# Patient Record
Sex: MALE | Race: WHITE | NOT HISPANIC OR LATINO | Employment: PART TIME | URBAN - METROPOLITAN AREA
[De-identification: names, ages, dates, MRNs, and addresses within clinical notes are randomized per-mention and may not be internally consistent; named-entity substitution may affect disease eponyms.]

---

## 2023-09-27 DIAGNOSIS — M54.2 CERVICAL PAIN: Primary | ICD-10-CM

## 2023-09-29 ENCOUNTER — HOSPITAL ENCOUNTER (OUTPATIENT)
Dept: DATA CONVERSION | Facility: HOSPITAL | Age: 28
Discharge: HOME | End: 2023-09-29
Payer: MEDICARE

## 2023-10-06 ENCOUNTER — APPOINTMENT (OUTPATIENT)
Dept: PHYSICAL THERAPY | Facility: CLINIC | Age: 28
End: 2023-10-06
Payer: MEDICARE

## 2023-10-12 ENCOUNTER — APPOINTMENT (OUTPATIENT)
Dept: PHYSICAL THERAPY | Facility: CLINIC | Age: 28
End: 2023-10-12
Payer: MEDICARE

## 2023-10-19 ENCOUNTER — TREATMENT (OUTPATIENT)
Dept: PHYSICAL THERAPY | Facility: CLINIC | Age: 28
End: 2023-10-19
Payer: MEDICARE

## 2023-10-19 ENCOUNTER — APPOINTMENT (OUTPATIENT)
Dept: PHYSICAL THERAPY | Facility: CLINIC | Age: 28
End: 2023-10-19
Payer: MEDICARE

## 2023-10-19 DIAGNOSIS — M54.2 CERVICAL PAIN: ICD-10-CM

## 2023-10-19 PROCEDURE — 97110 THERAPEUTIC EXERCISES: CPT | Mod: GP,CQ

## 2023-10-19 NOTE — PROGRESS NOTES
Physical Therapy Treatment    Patient Name: Stevenson Kelley  MRN: [id number]  Today's Date: 10/19/2023  Time Calculation  Start Time: 0930  Stop Time: 0945  Time Calculation (min): 15 min  PT Therapeutic Procedures Time Entry  Therapeutic Exercise Time Entry: 15  Visit # 4/5    Current Problem   1. Cervical pain  PT eval and treat          Subjective   General    Pt claims he feels much better. Will continue on his own with his HEP.  Precautions:   None  Pain   0/10   Post Treatment Pain Level same    Objective       Findings:   UE AROM WNL    Treatments:   Reviewed HEP      Assessment   Assessment:    Pt has achieved all goals.    Plan:   D/C        Goals:    No pain with sleeping or work activities...achieved   Ue's AROM WNL.....achieved  I with HEP...achieved

## 2024-05-30 ENCOUNTER — OFFICE VISIT (OUTPATIENT)
Dept: PRIMARY CARE | Facility: CLINIC | Age: 29
End: 2024-05-30
Payer: MEDICARE

## 2024-05-30 VITALS
OXYGEN SATURATION: 98 % | SYSTOLIC BLOOD PRESSURE: 110 MMHG | WEIGHT: 141 LBS | DIASTOLIC BLOOD PRESSURE: 70 MMHG | HEART RATE: 78 BPM | BODY MASS INDEX: 20.88 KG/M2 | TEMPERATURE: 97.9 F | HEIGHT: 69 IN

## 2024-05-30 DIAGNOSIS — R21 RASH AND NONSPECIFIC SKIN ERUPTION: Primary | ICD-10-CM

## 2024-05-30 PROCEDURE — 1036F TOBACCO NON-USER: CPT

## 2024-05-30 PROCEDURE — 99213 OFFICE O/P EST LOW 20 MIN: CPT

## 2024-05-30 RX ORDER — TRIAMCINOLONE ACETONIDE 1 MG/G
CREAM TOPICAL 2 TIMES DAILY
Qty: 15 G | Refills: 0 | Status: SHIPPED | OUTPATIENT
Start: 2024-05-30 | End: 2024-06-06 | Stop reason: ALTCHOICE

## 2024-05-30 ASSESSMENT — PATIENT HEALTH QUESTIONNAIRE - PHQ9
1. LITTLE INTEREST OR PLEASURE IN DOING THINGS: NOT AT ALL
2. FEELING DOWN, DEPRESSED OR HOPELESS: NOT AT ALL
SUM OF ALL RESPONSES TO PHQ9 QUESTIONS 1 AND 2: 0

## 2024-05-30 ASSESSMENT — PAIN SCALES - GENERAL: PAINLEVEL: 4

## 2024-05-30 NOTE — PROGRESS NOTES
"Subjective   Patient ID: Stevenson Kelley is a 28 y.o. male who presents for Insect Bite (Tick bite approximately 1 1/2 - 2 weeks ago.  Went to Urgent care ([facility]) the day of the bite and Urgent Care removed it.  He was given 2 pills of Doxycycline which he took and finished. He also has tick bite on his neck prior and removed it himself with no redness that he is aware of.//Patient now has redness now where the original bite occurred on his RUQ.  It is painful and the redness started approximately four days ago.//Tdap 7/22/2021).    HPI   Stevenson is seen for complaint of rash on his torso.  Reports 2 tick bites over the past few months.  Was most recently seen about 2 weeks ago at urgent care and prescribed prophylactic dose of doxycycline for tick bite on right torso.  Reports red rash that started about 4 days ago in the same spot of the tick bite.  Works in the [employer].  Reports tenderness to palpation, itching.  Denies fever, chills, shortness of breath, fatigue, joint pain.     Review of Systems  All other systems have been reviewed and are negative except as noted in the HPI.     Objective   /70 (BP Location: Left arm, Patient Position: Sitting, BP Cuff Size: Adult)   Pulse 78   Temp 36.6 °C (97.9 °F)   Ht 1.74 m (5' 8.5\")   Wt 64 kg (141 lb)   SpO2 98%   BMI 21.13 kg/m²     Physical Exam  Vitals and nursing note reviewed.   Constitutional:       General: He is not in acute distress.  Eyes:      Extraocular Movements: Extraocular movements intact.      Conjunctiva/sclera: Conjunctivae normal.   Cardiovascular:      Rate and Rhythm: Normal rate.   Pulmonary:      Effort: Pulmonary effort is normal.   Abdominal:       Musculoskeletal:         General: Normal range of motion.      Cervical back: Neck supple.   Skin:     General: Skin is warm.   Neurological:      General: No focal deficit present.      Mental Status: He is alert.   Psychiatric:         Mood and Affect: Mood normal. "       Assessment/Plan   Problem List Items Addressed This Visit    None  Visit Diagnoses         Codes    Rash and nonspecific skin eruption    -  Primary  Acute.  Low suspicion for cellulitis, Lyme disease at this time.  Discussed possibility of inflammatory response s/p tick bite vs secondary insect bite.  Apply triamcinolone as directed to affected area.  Keep clean, dry.  OTC antihistamine as directed.  Follow-up in 1 week to ensure resolution of rash.  Follow-up sooner for development of fever, chills, joint pain.  R21    Relevant Medications    triamcinolone (Kenalog) 0.1 % cream

## 2024-05-31 ENCOUNTER — APPOINTMENT (OUTPATIENT)
Dept: PRIMARY CARE | Facility: CLINIC | Age: 29
End: 2024-05-31
Payer: MEDICARE

## 2024-05-31 ENCOUNTER — TELEPHONE (OUTPATIENT)
Dept: PRIMARY CARE | Facility: CLINIC | Age: 29
End: 2024-05-31

## 2024-05-31 NOTE — TELEPHONE ENCOUNTER
PT WAS SEEN YESTERDAY FOR TICK BITE, TODAY HE IS NOT FEELING WELL,  FATIGUE,  CAN SEE A BULLSEYE STARTING TO FORM .   [phone]

## 2024-06-05 PROBLEM — F32.A DEPRESSIVE STATE: Status: RESOLVED | Noted: 2022-08-16 | Resolved: 2024-06-05

## 2024-06-05 PROBLEM — M54.2 NECK PAIN: Status: RESOLVED | Noted: 2023-09-27 | Resolved: 2024-06-05

## 2024-06-06 ENCOUNTER — OFFICE VISIT (OUTPATIENT)
Dept: PRIMARY CARE | Facility: CLINIC | Age: 29
End: 2024-06-06
Payer: MEDICARE

## 2024-06-06 VITALS
WEIGHT: 140.6 LBS | BODY MASS INDEX: 20.83 KG/M2 | HEART RATE: 68 BPM | TEMPERATURE: 98.1 F | HEIGHT: 69 IN | DIASTOLIC BLOOD PRESSURE: 65 MMHG | SYSTOLIC BLOOD PRESSURE: 108 MMHG | OXYGEN SATURATION: 97 %

## 2024-06-06 DIAGNOSIS — R21 RASH: Primary | ICD-10-CM

## 2024-06-06 PROCEDURE — 99213 OFFICE O/P EST LOW 20 MIN: CPT | Performed by: FAMILY MEDICINE

## 2024-06-06 PROCEDURE — 1036F TOBACCO NON-USER: CPT | Performed by: FAMILY MEDICINE

## 2024-06-06 RX ORDER — DOXYCYCLINE 100 MG/1
100 CAPSULE ORAL 2 TIMES DAILY
COMMUNITY
Start: 2024-05-31 | End: 2024-06-10

## 2024-06-06 NOTE — PROGRESS NOTES
"Subjective   Patient ID: Stevenson Kelley is a 28 y.o. male who presents for Follow-up (On rash - tic bite).    HPI   He is here for follow-up of a tick bite.  He was seen in this office on 5/30/2024 after having been seen in urgent care several days before that.  In urgent care a tick was removed and was given 2 doses of doxycycline.  When he was seen on 5/30 the rash was much bigger but no other signs of illness.  It was recommended that he treat the rash locally with topical cortisone and antihistamines.  Later that day the rash become much larger so he went to urgent care.  There he was prescribed 10 days of doxycycline for presumed Lyme disease.  The day after taking the antibiotic his chills and headache which had developed the night for went away.  His rash also has begun to disappear.  He is now feeling just about back to normal.  Review of Systems  Denies headache, blurred vision, chest pain, shortness of breath, nausea or vomiting, change in bowel habits or leg pain or swelling.    Objective   /65 (BP Location: Left arm, Patient Position: Sitting, BP Cuff Size: Adult)   Pulse 68   Temp 36.7 °C (98.1 °F)   Ht 1.74 m (5' 8.5\")   Wt 63.8 kg (140 lb 9.6 oz)   SpO2 97%   BMI 21.07 kg/m²     Physical Exam  Vitals and nurse's notes reviewed  General: no acute distress  HEENT: Normal  Neck: Supple  Lungs: Clear  Cardio: RRR w/o murmur  Abdomen: Soft, nontender, no hepatosplenomegaly  Extremities: No edema, no calf tenderness  Neuro: Alert and oriented with no focal deficits  Skin: Healing erythematous patch on his right torso.  Much less red and smaller than it was when he was seen on 5/30.    Assessment/Plan   Problem List Items Addressed This Visit             ICD-10-CM       Medium    Rash - Primary R21     Presumed Lyme disease now improved.  He is to finish his course of doxycycline which would be for total of 10 days.  As long as his rash continues to clear and he has no further symptoms no further " follow-ups are necessary.

## 2024-10-04 ENCOUNTER — OFFICE VISIT (OUTPATIENT)
Dept: PRIMARY CARE | Facility: CLINIC | Age: 29
End: 2024-10-04
Payer: MEDICARE

## 2024-10-04 VITALS
HEART RATE: 64 BPM | DIASTOLIC BLOOD PRESSURE: 80 MMHG | SYSTOLIC BLOOD PRESSURE: 124 MMHG | BODY MASS INDEX: 22.13 KG/M2 | OXYGEN SATURATION: 98 % | WEIGHT: 146 LBS | HEIGHT: 68 IN

## 2024-10-04 DIAGNOSIS — M76.62 ACHILLES TENDINITIS OF LEFT LOWER EXTREMITY: ICD-10-CM

## 2024-10-04 DIAGNOSIS — F32.0 CURRENT MILD EPISODE OF MAJOR DEPRESSIVE DISORDER WITHOUT PRIOR EPISODE (CMS-HCC): Primary | ICD-10-CM

## 2024-10-04 PROCEDURE — 1036F TOBACCO NON-USER: CPT

## 2024-10-04 PROCEDURE — 3008F BODY MASS INDEX DOCD: CPT

## 2024-10-04 PROCEDURE — 99214 OFFICE O/P EST MOD 30 MIN: CPT

## 2024-10-04 RX ORDER — PREDNISONE 20 MG/1
40 TABLET ORAL DAILY
Qty: 10 TABLET | Refills: 0 | Status: SHIPPED | OUTPATIENT
Start: 2024-10-04 | End: 2024-10-09

## 2024-10-04 ASSESSMENT — PATIENT HEALTH QUESTIONNAIRE - PHQ9
1. LITTLE INTEREST OR PLEASURE IN DOING THINGS: NEARLY EVERY DAY
4. FEELING TIRED OR HAVING LITTLE ENERGY: SEVERAL DAYS
8. MOVING OR SPEAKING SO SLOWLY THAT OTHER PEOPLE COULD HAVE NOTICED. OR THE OPPOSITE, BEING SO FIGETY OR RESTLESS THAT YOU HAVE BEEN MOVING AROUND A LOT MORE THAN USUAL: NOT AT ALL
6. FEELING BAD ABOUT YOURSELF - OR THAT YOU ARE A FAILURE OR HAVE LET YOURSELF OR YOUR FAMILY DOWN: MORE THAN HALF THE DAYS
SUM OF ALL RESPONSES TO PHQ9 QUESTIONS 1 AND 2: 6
5. POOR APPETITE OR OVEREATING: NOT AT ALL
9. THOUGHTS THAT YOU WOULD BE BETTER OFF DEAD, OR OF HURTING YOURSELF: NOT AT ALL
7. TROUBLE CONCENTRATING ON THINGS, SUCH AS READING THE NEWSPAPER OR WATCHING TELEVISION: NEARLY EVERY DAY
SUM OF ALL RESPONSES TO PHQ QUESTIONS 1-9: 13
10. IF YOU CHECKED OFF ANY PROBLEMS, HOW DIFFICULT HAVE THESE PROBLEMS MADE IT FOR YOU TO DO YOUR WORK, TAKE CARE OF THINGS AT HOME, OR GET ALONG WITH OTHER PEOPLE: VERY DIFFICULT
2. FEELING DOWN, DEPRESSED OR HOPELESS: NEARLY EVERY DAY
3. TROUBLE FALLING OR STAYING ASLEEP OR SLEEPING TOO MUCH: SEVERAL DAYS

## 2024-10-04 ASSESSMENT — COLUMBIA-SUICIDE SEVERITY RATING SCALE - C-SSRS: 1. IN THE PAST MONTH, HAVE YOU WISHED YOU WERE DEAD OR WISHED YOU COULD GO TO SLEEP AND NOT WAKE UP?: NO

## 2024-10-04 ASSESSMENT — PAIN SCALES - GENERAL: PAINLEVEL: 0-NO PAIN

## 2024-10-04 NOTE — PROGRESS NOTES
"Subjective   Patient ID: Stevenson Kelley is a 29 y.o. male who presents for Depression (Patient is in office today regarding depression .).    HPI   Depression for a few months. Proposed to girlfriend, wedding was called off, she is moving out with dog. Also struggling with finances. Reports some changes to sleep, decrease in motivation. Still going to gym, going to work. Denies changes to sleep, appetite. Denies SI, HI, self-harm.     Review of Systems  All other systems have been reviewed and are negative except as noted in the HPI.     Objective   /80   Pulse 64   Ht 1.727 m (5' 8\")   Wt 66.2 kg (146 lb)   SpO2 98%   BMI 22.20 kg/m²     Physical Exam  Vitals and nursing note reviewed.   Constitutional:       General: He is not in acute distress.     Appearance: Normal appearance.   Eyes:      Extraocular Movements: Extraocular movements intact.      Conjunctiva/sclera: Conjunctivae normal.   Cardiovascular:      Rate and Rhythm: Normal rate.   Pulmonary:      Effort: Pulmonary effort is normal.   Musculoskeletal:      Cervical back: Neck supple.      Left lower leg: Normal.      Left ankle: No swelling. No tenderness. Normal range of motion.      Left Achilles Tendon: Tenderness present. Miek's test negative.      Left foot: Normal.   Neurological:      General: No focal deficit present.      Mental Status: He is alert.   Psychiatric:         Mood and Affect: Mood normal.         Assessment/Plan   Assessment & Plan  Current mild episode of major depressive disorder without prior episode (CMS-HCC)  Acute exacerbation, likely situational.   Discussed medication therapy vs counseling vs dual therapy. Patient declines medication at this time and would like to proceed with counseling. Referral placed.   Follow up in 1 month or sooner if needed.   Orders:    Referral to [facility]; Future    Achilles tendinitis of left lower extremity  Acute.   Prednisone as directed. Risks and " benefits of medication discussed and prescribed.   OTC Tylenol as directed. Rest, ice, compression, elevation.   Follow up if symptoms do not improve within 1-2 weeks, discussed referral to PT/ortho at this time.   Orders:    predniSONE (Deltasone) 20 mg tablet; Take 2 tablets (40 mg) by mouth once daily for 5 days.

## 2024-11-07 ENCOUNTER — TELEPHONE (OUTPATIENT)
Dept: PRIMARY CARE | Facility: CLINIC | Age: 29
End: 2024-11-07

## 2024-11-07 ENCOUNTER — APPOINTMENT (OUTPATIENT)
Dept: PRIMARY CARE | Facility: CLINIC | Age: 29
End: 2024-11-07
Payer: MEDICARE

## 2024-11-07 VITALS
RESPIRATION RATE: 16 BRPM | OXYGEN SATURATION: 97 % | SYSTOLIC BLOOD PRESSURE: 116 MMHG | DIASTOLIC BLOOD PRESSURE: 60 MMHG | BODY MASS INDEX: 22.35 KG/M2 | WEIGHT: 147 LBS | HEART RATE: 75 BPM

## 2024-11-07 DIAGNOSIS — L70.9 ACNE, UNSPECIFIED ACNE TYPE: ICD-10-CM

## 2024-11-07 DIAGNOSIS — F32.0 CURRENT MILD EPISODE OF MAJOR DEPRESSIVE DISORDER WITHOUT PRIOR EPISODE (CMS-HCC): Primary | ICD-10-CM

## 2024-11-07 PROBLEM — G44.209 TENSION TYPE HEADACHE: Status: ACTIVE | Noted: 2023-08-18

## 2024-11-07 PROCEDURE — 99213 OFFICE O/P EST LOW 20 MIN: CPT

## 2024-11-07 ASSESSMENT — PATIENT HEALTH QUESTIONNAIRE - PHQ9
7. TROUBLE CONCENTRATING ON THINGS, SUCH AS READING THE NEWSPAPER OR WATCHING TELEVISION: SEVERAL DAYS
1. LITTLE INTEREST OR PLEASURE IN DOING THINGS: MORE THAN HALF THE DAYS
10. IF YOU CHECKED OFF ANY PROBLEMS, HOW DIFFICULT HAVE THESE PROBLEMS MADE IT FOR YOU TO DO YOUR WORK, TAKE CARE OF THINGS AT HOME, OR GET ALONG WITH OTHER PEOPLE: SOMEWHAT DIFFICULT
SUM OF ALL RESPONSES TO PHQ QUESTIONS 1-9: 6
8. MOVING OR SPEAKING SO SLOWLY THAT OTHER PEOPLE COULD HAVE NOTICED. OR THE OPPOSITE, BEING SO FIGETY OR RESTLESS THAT YOU HAVE BEEN MOVING AROUND A LOT MORE THAN USUAL: NOT AT ALL
6. FEELING BAD ABOUT YOURSELF - OR THAT YOU ARE A FAILURE OR HAVE LET YOURSELF OR YOUR FAMILY DOWN: SEVERAL DAYS
4. FEELING TIRED OR HAVING LITTLE ENERGY: NOT AT ALL
9. THOUGHTS THAT YOU WOULD BE BETTER OFF DEAD, OR OF HURTING YOURSELF: NOT AT ALL
2. FEELING DOWN, DEPRESSED OR HOPELESS: MORE THAN HALF THE DAYS
5. POOR APPETITE OR OVEREATING: NOT AT ALL
SUM OF ALL RESPONSES TO PHQ9 QUESTIONS 1 AND 2: 4
3. TROUBLE FALLING OR STAYING ASLEEP OR SLEEPING TOO MUCH: NOT AT ALL

## 2024-11-07 ASSESSMENT — PAIN SCALES - GENERAL: PAINLEVEL_OUTOF10: 0-NO PAIN

## 2024-11-07 NOTE — PROGRESS NOTES
Subjective   Patient ID: Stevenson Kelley is a 29 y.o. male who presents for Depression (Patient is here for a depression check patient refused the flu shot).    HPI   Stevenson is seen for depression follow up. Symptoms ebb and flow. Has been struggling at work which causes some stress. Reports improvement in sleep. Still going to gym, has family support. Has had trouble setting up counseling. Denies SI, HI, self-harm, changes to appetite.     Review of Systems   Skin:         Acne       Objective   /60 (BP Location: Left arm, Patient Position: Sitting, BP Cuff Size: Large adult)   Pulse 75   Resp 16   Wt 66.7 kg (147 lb)   SpO2 97%   BMI 22.35 kg/m²     Physical Exam  Vitals and nursing note reviewed.   Constitutional:       General: He is not in acute distress.  Eyes:      Extraocular Movements: Extraocular movements intact.      Conjunctiva/sclera: Conjunctivae normal.   Cardiovascular:      Rate and Rhythm: Normal rate.   Pulmonary:      Effort: Pulmonary effort is normal.   Musculoskeletal:      Cervical back: Neck supple.   Neurological:      General: No focal deficit present.      Mental Status: He is alert.   Psychiatric:         Mood and Affect: Mood normal.       Assessment/Plan   Assessment & Plan  Current mild episode of major depressive disorder without prior episode (CMS-HCC)  Chronic.   Referral to psychology placed as patient has had trouble setting up counseling session at external facilities.   We discussed medication therapy, patient declines.   Orders:    Referral to Psychology; Future    Acne, unspecified acne type  Chronic.   Discussed hygiene including cleansing face after exercise or vigorous activity. Wash pillow sheets often. Avoid touching face.   Patient provided referral handout to [facility].

## 2024-11-10 ASSESSMENT — ENCOUNTER SYMPTOMS: ROS SKIN COMMENTS: ACNE

## 2025-01-07 ENCOUNTER — APPOINTMENT (OUTPATIENT)
Dept: BEHAVIORAL HEALTH | Facility: CLINIC | Age: 30
End: 2025-01-07
Payer: MEDICARE

## 2025-01-28 ENCOUNTER — APPOINTMENT (OUTPATIENT)
Dept: BEHAVIORAL HEALTH | Facility: CLINIC | Age: 30
End: 2025-01-28
Payer: MEDICARE

## 2025-02-13 ENCOUNTER — APPOINTMENT (OUTPATIENT)
Dept: PRIMARY CARE | Facility: CLINIC | Age: 30
End: 2025-02-13
Payer: MEDICARE

## 2025-02-13 VITALS
HEIGHT: 68 IN | SYSTOLIC BLOOD PRESSURE: 132 MMHG | HEART RATE: 89 BPM | DIASTOLIC BLOOD PRESSURE: 76 MMHG | BODY MASS INDEX: 20.46 KG/M2 | OXYGEN SATURATION: 99 % | TEMPERATURE: 98 F | WEIGHT: 135 LBS

## 2025-02-13 DIAGNOSIS — Z13.1 SCREENING FOR DIABETES MELLITUS: ICD-10-CM

## 2025-02-13 DIAGNOSIS — F32.0 CURRENT MILD EPISODE OF MAJOR DEPRESSIVE DISORDER WITHOUT PRIOR EPISODE (CMS-HCC): ICD-10-CM

## 2025-02-13 DIAGNOSIS — Z00.00 WELL ADULT EXAM: Primary | ICD-10-CM

## 2025-02-13 DIAGNOSIS — Z13.6 SCREENING FOR HEART DISEASE: ICD-10-CM

## 2025-02-13 PROCEDURE — 3008F BODY MASS INDEX DOCD: CPT

## 2025-02-13 PROCEDURE — 1036F TOBACCO NON-USER: CPT

## 2025-02-13 PROCEDURE — 99395 PREV VISIT EST AGE 18-39: CPT

## 2025-02-13 RX ORDER — DOXYCYCLINE 100 MG/1
CAPSULE ORAL
COMMUNITY
Start: 2025-01-17

## 2025-02-13 ASSESSMENT — ENCOUNTER SYMPTOMS
MYALGIAS: 0
NAUSEA: 0
DYSPHORIC MOOD: 0
FEVER: 0
ABDOMINAL PAIN: 0
BLOOD IN STOOL: 0
WEAKNESS: 0
VOMITING: 0
TROUBLE SWALLOWING: 0
NERVOUS/ANXIOUS: 0
SHORTNESS OF BREATH: 0
HEMATURIA: 0
ARTHRALGIAS: 0
DIFFICULTY URINATING: 0
SORE THROAT: 0
NUMBNESS: 0
UNEXPECTED WEIGHT CHANGE: 0
COUGH: 0
CHILLS: 0

## 2025-02-13 ASSESSMENT — PATIENT HEALTH QUESTIONNAIRE - PHQ9
SUM OF ALL RESPONSES TO PHQ9 QUESTIONS 1 AND 2: 0
2. FEELING DOWN, DEPRESSED OR HOPELESS: NOT AT ALL
1. LITTLE INTEREST OR PLEASURE IN DOING THINGS: NOT AT ALL

## 2025-02-13 ASSESSMENT — PAIN SCALES - GENERAL: PAINLEVEL_OUTOF10: 0-NO PAIN

## 2025-02-13 NOTE — PROGRESS NOTES
Subjective   Patient ID: Stevenson Kelley is a 29 y.o. male who presents for Annual Exam.    HPI   Stevenson Kelley is seen for his comprehensive physical exam. PMH, SH, FH, medications were reviewed and updated.     CHRONIC ISSUES:   Depression: Is seeing a counselor, helping somewhat. Reports stress from having to pay for visits. Denies SI, HI, self-harm.     IMMUNIZATIONS:   Immunization History   Administered Date(s) Administered    Pfizer Purple Cap SARS-CoV-2 10/06/2021, 10/27/2021    Tdap vaccine, age 7 year and older (BOOSTRIX, ADACEL) 07/22/2021   Influenza: Declines     LUNG CANCER SCREENING (50-77 y.o. with 20+ pack year hx & current smoker or quit <15yrs ago)  Social History     Tobacco Use   Smoking Status Never    Passive exposure: Never   Smokeless Tobacco Never     COLORECTAL SCREENING (From 46 or 10yrs younger than family diagnosis)  Last colonoscopy - Never   Next colonoscopy due - 46   Relevant FHx - Paternal grandmother    PROSTATE CANCER SCREENING (From 51 y.o. until 71 y.o.)  Last PSA - Never   Next PSA due - 51   Relevant FHx - Fathers brother, dx in 60s    Due for annual eye exam  Exercises 5x/week, diet high in protein/vegetables    Review of Systems   Constitutional:  Negative for chills, fever and unexpected weight change.   HENT:  Negative for congestion, hearing loss, postnasal drip, sore throat and trouble swallowing.    Eyes:  Negative for visual disturbance.   Respiratory:  Negative for cough and shortness of breath.    Cardiovascular:  Negative for chest pain and leg swelling.   Gastrointestinal:  Negative for abdominal pain, blood in stool, nausea and vomiting.   Genitourinary:  Negative for difficulty urinating and hematuria.   Musculoskeletal:  Negative for arthralgias and myalgias.   Skin:  Negative for rash.   Neurological:  Negative for weakness and numbness.   Psychiatric/Behavioral:  Negative for dysphoric mood. The patient is not nervous/anxious.    All other systems reviewed  "and are negative.    Objective   /76   Pulse 89   Temp 36.7 °C (98 °F)   Ht 1.715 m (5' 7.5\")   Wt 61.2 kg (135 lb)   SpO2 99%   BMI 20.83 kg/m²     Physical Exam  Vitals and nursing note reviewed.   Constitutional:       Appearance: Normal appearance.   HENT:      Head: Normocephalic.      Right Ear: Tympanic membrane and ear canal normal.      Left Ear: Tympanic membrane and ear canal normal.      Nose: Nose normal.      Mouth/Throat:      Mouth: Mucous membranes are moist.   Eyes:      Extraocular Movements: Extraocular movements intact.      Conjunctiva/sclera: Conjunctivae normal.      Pupils: Pupils are equal, round, and reactive to light.   Cardiovascular:      Rate and Rhythm: Normal rate and regular rhythm.      Pulses:           Dorsalis pedis pulses are 2+ on the right side and 2+ on the left side.   Pulmonary:      Effort: Pulmonary effort is normal.      Breath sounds: Normal breath sounds.   Abdominal:      General: Abdomen is flat. Bowel sounds are normal.      Palpations: Abdomen is soft.      Tenderness: There is no abdominal tenderness.   Genitourinary:     Comments: Declines  exam    Musculoskeletal:         General: Normal range of motion.      Cervical back: Normal range of motion and neck supple.      Right lower leg: No edema.      Left lower leg: No edema.   Lymphadenopathy:      Cervical: No cervical adenopathy.   Skin:     General: Skin is warm.   Neurological:      General: No focal deficit present.      Mental Status: He is alert.   Psychiatric:         Mood and Affect: Mood normal.       Assessment/Plan   Assessment & Plan  Well adult exam  Preventative measures discussed. Labs ordered, will follow up with results.  Immunizations discussed. CPE in 1 year or follow up sooner if needed.     Orders:    Lipid Panel; Future    Comprehensive metabolic panel; Future    Vitamin D 25-Hydroxy,Total (for eval of Vitamin D levels); Future    Screening for heart disease    Orders:    " Lipid Panel; Future    Screening for diabetes mellitus    Orders:    Comprehensive metabolic panel; Future    Current mild episode of major depressive disorder without prior episode (CMS-HCC)  Chronic, stable. Continue seeing counselor as scheduled. Encouraged patient to follow up if symptoms worsen or if he becomes interested in medication.     Orders:    Vitamin D 25-Hydroxy,Total (for eval of Vitamin D levels); Future

## 2026-02-19 ENCOUNTER — APPOINTMENT (OUTPATIENT)
Dept: PRIMARY CARE | Facility: CLINIC | Age: 31
End: 2026-02-19
Payer: MEDICARE